# Patient Record
Sex: MALE | Race: OTHER | NOT HISPANIC OR LATINO
[De-identification: names, ages, dates, MRNs, and addresses within clinical notes are randomized per-mention and may not be internally consistent; named-entity substitution may affect disease eponyms.]

---

## 2017-05-23 ENCOUNTER — APPOINTMENT (OUTPATIENT)
Dept: INTERNAL MEDICINE | Facility: CLINIC | Age: 52
End: 2017-05-23

## 2017-05-23 VITALS
WEIGHT: 147 LBS | BODY MASS INDEX: 19.94 KG/M2 | SYSTOLIC BLOOD PRESSURE: 110 MMHG | HEART RATE: 72 BPM | DIASTOLIC BLOOD PRESSURE: 60 MMHG

## 2017-06-08 ENCOUNTER — OTHER (OUTPATIENT)
Age: 52
End: 2017-06-08

## 2018-05-24 ENCOUNTER — APPOINTMENT (OUTPATIENT)
Dept: INTERNAL MEDICINE | Facility: CLINIC | Age: 53
End: 2018-05-24
Payer: COMMERCIAL

## 2018-05-24 VITALS
WEIGHT: 146 LBS | BODY MASS INDEX: 19.8 KG/M2 | HEART RATE: 70 BPM | RESPIRATION RATE: 14 BRPM | DIASTOLIC BLOOD PRESSURE: 62 MMHG | SYSTOLIC BLOOD PRESSURE: 108 MMHG

## 2018-05-24 DIAGNOSIS — Z81.8 FAMILY HISTORY OF OTHER MENTAL AND BEHAVIORAL DISORDERS: ICD-10-CM

## 2018-05-24 PROCEDURE — 99396 PREV VISIT EST AGE 40-64: CPT

## 2018-05-24 NOTE — ASSESSMENT
[FreeTextEntry1] : Screening for depression done at this visit.  10 minutes spent in assessment and review.\par

## 2018-05-24 NOTE — COUNSELING
[Healthy eating counseling provided] : healthy eating [Activity counseling provided] : activity [None] : None [Good understanding] : Patient has a good understanding of lifestyle changes and the steps needed to achieve self management goals

## 2018-05-24 NOTE — HISTORY OF PRESENT ILLNESS
[FreeTextEntry1] : Here for CPE.\par  [de-identified] : Generally feels well with no specific complaints.\par

## 2018-05-24 NOTE — HEALTH RISK ASSESSMENT
[Excellent] : ~his/her~  mood as  excellent [No falls in past year] : Patient reported no falls in the past year [0] : 2) Feeling down, depressed, or hopeless: Not at all (0) [Reviewed and Current] :  reviewed and current [None] : None [With Family] : lives with family [Employed] : employed [] :  [Feels Safe at Home] : Feels safe at home [Fully functional (bathing, dressing, toileting, transferring, walking, feeding)] : Fully functional (bathing, dressing, toileting, transferring, walking, feeding) [Fully functional (using the telephone, shopping, preparing meals, housekeeping, doing laundry, using] : Fully functional and needs no help or supervision to perform IADLs (using the telephone, shopping, preparing meals, housekeeping, doing laundry, using transportation, managing medications and managing finances) [Smoke Detector] : smoke detector [Seat Belt] :  uses seat belt [] : No [[id number]] : 0 [Change in mental status noted] : No change in mental status noted [Language] : denies difficulty with language [Behavior] : denies difficulty with behavior [Handling Complex Tasks] : denies difficulty handling complex tasks [Reports changes in hearing] : Reports no changes in hearing [Reports changes in vision] : Reports no changes in vision [Reports changes in dental health] : Reports no changes in dental health [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

## 2018-05-30 ENCOUNTER — OTHER (OUTPATIENT)
Age: 53
End: 2018-05-30

## 2019-03-25 ENCOUNTER — TRANSCRIPTION ENCOUNTER (OUTPATIENT)
Age: 54
End: 2019-03-25

## 2019-05-28 ENCOUNTER — APPOINTMENT (OUTPATIENT)
Dept: INTERNAL MEDICINE | Facility: CLINIC | Age: 54
End: 2019-05-28
Payer: COMMERCIAL

## 2019-05-28 VITALS
WEIGHT: 149 LBS | SYSTOLIC BLOOD PRESSURE: 106 MMHG | RESPIRATION RATE: 14 BRPM | DIASTOLIC BLOOD PRESSURE: 68 MMHG | HEART RATE: 74 BPM | BODY MASS INDEX: 20.21 KG/M2

## 2019-05-28 DIAGNOSIS — Z86.69 PERSONAL HISTORY OF OTHER DISEASES OF THE NERVOUS SYSTEM AND SENSE ORGANS: ICD-10-CM

## 2019-05-28 DIAGNOSIS — M54.5 LOW BACK PAIN: ICD-10-CM

## 2019-05-28 PROCEDURE — G0444 DEPRESSION SCREEN ANNUAL: CPT

## 2019-05-28 PROCEDURE — 99396 PREV VISIT EST AGE 40-64: CPT

## 2019-05-28 PROCEDURE — 99386 PREV VISIT NEW AGE 40-64: CPT

## 2019-05-28 NOTE — HEALTH RISK ASSESSMENT
[Excellent] : ~his/her~  mood as  excellent [] : No [No falls in past year] : Patient reported no falls in the past year [0] : 2) Feeling down, depressed, or hopeless: Not at all (0) [de-identified] : regularly [[id number]] : 0 [Change in mental status noted] : No change in mental status noted [Language] : denies difficulty with language [Learning/Retaining New Information] : denies difficulty learning/retaining new information [Behavior] : denies difficulty with behavior [Handling Complex Tasks] : denies difficulty handling complex tasks [Reasoning] : denies difficulty with reasoning [Spatial Ability and Orientation] : denies difficulty with spatial ability and orientation [None] : None [With Family] : lives with family [Employed] : employed [] :  [Feels Safe at Home] : Feels safe at home [Fully functional (bathing, dressing, toileting, transferring, walking, feeding)] : Fully functional (bathing, dressing, toileting, transferring, walking, feeding) [Fully functional (using the telephone, shopping, preparing meals, housekeeping, doing laundry, using] : Fully functional and needs no help or supervision to perform IADLs (using the telephone, shopping, preparing meals, housekeeping, doing laundry, using transportation, managing medications and managing finances) [Reports changes in hearing] : Reports no changes in hearing [Reports changes in vision] : Reports no changes in vision [Reports normal functional visual acuity (ie: able to read med bottle)] : Reports normal functional visual acuity [Reports changes in dental health] : Reports no changes in dental health [Smoke Detector] : smoke detector [Seat Belt] :  uses seat belt

## 2019-05-28 NOTE — PHYSICAL EXAM
[No Acute Distress] : no acute distress [Well Nourished] : well nourished [Well Developed] : well developed [Well-Appearing] : well-appearing [PERRL] : pupils equal round and reactive to light [Normal Sclera/Conjunctiva] : normal sclera/conjunctiva [EOMI] : extraocular movements intact [Normal Outer Ear/Nose] : the outer ears and nose were normal in appearance [Normal Oropharynx] : the oropharynx was normal [Supple] : supple [No JVD] : no jugular venous distention [No Lymphadenopathy] : no lymphadenopathy [Thyroid Normal, No Nodules] : the thyroid was normal and there were no nodules present [Clear to Auscultation] : lungs were clear to auscultation bilaterally [No Accessory Muscle Use] : no accessory muscle use [No Respiratory Distress] : no respiratory distress  [Normal Rate] : normal rate  [Regular Rhythm] : with a regular rhythm [Normal S1, S2] : normal S1 and S2 [No Murmur] : no murmur heard [No Abdominal Bruit] : a ~M bruit was not heard ~T in the abdomen [No Carotid Bruits] : no carotid bruits [Pedal Pulses Present] : the pedal pulses are present [No Varicosities] : no varicosities [No Edema] : there was no peripheral edema [No Extremity Clubbing/Cyanosis] : no extremity clubbing/cyanosis [No Palpable Aorta] : no palpable aorta [Non Tender] : non-tender [Soft] : abdomen soft [No HSM] : no HSM [No Masses] : no abdominal mass palpated [Non-distended] : non-distended [Normal Posterior Cervical Nodes] : no posterior cervical lymphadenopathy [Normal Bowel Sounds] : normal bowel sounds [No CVA Tenderness] : no CVA  tenderness [Normal Anterior Cervical Nodes] : no anterior cervical lymphadenopathy [No Joint Swelling] : no joint swelling [No Spinal Tenderness] : no spinal tenderness [Grossly Normal Strength/Tone] : grossly normal strength/tone [No Rash] : no rash [Normal Gait] : normal gait [Coordination Grossly Intact] : coordination grossly intact [No Focal Deficits] : no focal deficits [Deep Tendon Reflexes (DTR)] : deep tendon reflexes were 2+ and symmetric [Normal Affect] : the affect was normal [Normal Insight/Judgement] : insight and judgment were intact

## 2019-05-28 NOTE — HISTORY OF PRESENT ILLNESS
[de-identified] : Generally feels well with no specific complaints.\par \par Denies headache, dizziness.\par Denies rash, fatigue, fever, weight loss, anorexia.\par Denies cough, wheezing.\par Denies CP, SOB, BROOKS, edema, palpitations.\par Denies abdominal pain, N/V/D/C. Denies BRBPR, melena\par Denies dysuria, frequency, hematuria.\par  [FreeTextEntry1] : Here for CPE.\par

## 2020-06-02 ENCOUNTER — APPOINTMENT (OUTPATIENT)
Dept: INTERNAL MEDICINE | Facility: CLINIC | Age: 55
End: 2020-06-02
Payer: COMMERCIAL

## 2020-06-02 VITALS
HEART RATE: 70 BPM | RESPIRATION RATE: 14 BRPM | DIASTOLIC BLOOD PRESSURE: 60 MMHG | SYSTOLIC BLOOD PRESSURE: 100 MMHG | WEIGHT: 143 LBS | BODY MASS INDEX: 19.39 KG/M2

## 2020-06-02 DIAGNOSIS — E55.9 VITAMIN D DEFICIENCY, UNSPECIFIED: ICD-10-CM

## 2020-06-02 DIAGNOSIS — E78.5 HYPERLIPIDEMIA, UNSPECIFIED: ICD-10-CM

## 2020-06-02 DIAGNOSIS — M22.2X2 PATELLOFEMORAL DISORDERS, LEFT KNEE: ICD-10-CM

## 2020-06-02 DIAGNOSIS — R73.03 PREDIABETES.: ICD-10-CM

## 2020-06-02 DIAGNOSIS — M75.00 ADHESIVE CAPSULITIS OF UNSPECIFIED SHOULDER: ICD-10-CM

## 2020-06-02 PROCEDURE — G0442 ANNUAL ALCOHOL SCREEN 15 MIN: CPT | Mod: 59

## 2020-06-02 PROCEDURE — 99396 PREV VISIT EST AGE 40-64: CPT

## 2020-06-02 NOTE — PHYSICAL EXAM
[No Acute Distress] : no acute distress [Well Nourished] : well nourished [Well Developed] : well developed [Well-Appearing] : well-appearing [PERRL] : pupils equal round and reactive to light [Normal Sclera/Conjunctiva] : normal sclera/conjunctiva [EOMI] : extraocular movements intact [Normal Outer Ear/Nose] : the outer ears and nose were normal in appearance [Normal Oropharynx] : the oropharynx was normal [No JVD] : no jugular venous distention [No Lymphadenopathy] : no lymphadenopathy [Supple] : supple [Thyroid Normal, No Nodules] : the thyroid was normal and there were no nodules present [No Respiratory Distress] : no respiratory distress  [No Accessory Muscle Use] : no accessory muscle use [Clear to Auscultation] : lungs were clear to auscultation bilaterally [Normal Rate] : normal rate  [Regular Rhythm] : with a regular rhythm [Normal S1, S2] : normal S1 and S2 [No Carotid Bruits] : no carotid bruits [No Murmur] : no murmur heard [No Abdominal Bruit] : a ~M bruit was not heard ~T in the abdomen [No Varicosities] : no varicosities [Pedal Pulses Present] : the pedal pulses are present [No Edema] : there was no peripheral edema [No Palpable Aorta] : no palpable aorta [No Extremity Clubbing/Cyanosis] : no extremity clubbing/cyanosis [Soft] : abdomen soft [Non Tender] : non-tender [Non-distended] : non-distended [No HSM] : no HSM [No Masses] : no abdominal mass palpated [Normal Bowel Sounds] : normal bowel sounds [Normal Posterior Cervical Nodes] : no posterior cervical lymphadenopathy [Normal Anterior Cervical Nodes] : no anterior cervical lymphadenopathy [No CVA Tenderness] : no CVA  tenderness [No Joint Swelling] : no joint swelling [No Spinal Tenderness] : no spinal tenderness [Grossly Normal Strength/Tone] : grossly normal strength/tone [No Rash] : no rash [Coordination Grossly Intact] : coordination grossly intact [No Focal Deficits] : no focal deficits [Deep Tendon Reflexes (DTR)] : deep tendon reflexes were 2+ and symmetric [Normal Gait] : normal gait [Normal Affect] : the affect was normal [Normal Insight/Judgement] : insight and judgment were intact [Normal] : Normal [None] : None [Patellar Grind] : positive patellar grind

## 2020-06-02 NOTE — ASSESSMENT
[FreeTextEntry1] : Age appropriate screening tests and vaccines reviewed and are up to date.\par \par This patient has no special communication needs.\par \par There are no significant behaviors affecting health.\par \par This patient's health literacy was assessed through teach back and was found to be adequate.\par \par Screening for depression done at this visit.  10 minutes spent in assessment and review.\par \par Knee extension exercises explained for patellofemoral syndrome\par \par \par \par \par

## 2020-06-02 NOTE — HEALTH RISK ASSESSMENT
[Excellent] : ~his/her~ current health as excellent [No] : In the past 12 months have you used drugs other than those required for medical reasons? No [No falls in past year] : Patient reported no falls in the past year [0] : 2) Feeling down, depressed, or hopeless: Not at all (0) [None] : None [With Family] : lives with family [Employed] : employed [] :  [Feels Safe at Home] : Feels safe at home [Fully functional (bathing, dressing, toileting, transferring, walking, feeding)] : Fully functional (bathing, dressing, toileting, transferring, walking, feeding) [Fully functional (using the telephone, shopping, preparing meals, housekeeping, doing laundry, using] : Fully functional and needs no help or supervision to perform IADLs (using the telephone, shopping, preparing meals, housekeeping, doing laundry, using transportation, managing medications and managing finances) [Reports normal functional visual acuity (ie: able to read med bottle)] : Reports normal functional visual acuity [Smoke Detector] : smoke detector [Seat Belt] :  uses seat belt [] : No [[id number]] : 0 [Change in mental status noted] : No change in mental status noted [Language] : denies difficulty with language [Behavior] : denies difficulty with behavior [Learning/Retaining New Information] : denies difficulty learning/retaining new information [Handling Complex Tasks] : denies difficulty handling complex tasks [Reasoning] : denies difficulty with reasoning [Spatial Ability and Orientation] : denies difficulty with spatial ability and orientation [Reports changes in vision] : Reports no changes in vision [Reports changes in hearing] : Reports no changes in hearing [Reports changes in dental health] : Reports no changes in dental health

## 2020-06-02 NOTE — HISTORY OF PRESENT ILLNESS
[FreeTextEntry1] : Mr. JONAS is here for an annual physical examination and assessment.\par  [de-identified] : He generally feels well with no specific complaints. His recent health has been good.\par Mild pain in left knee with traversing stairs.  No prior trauma.  No swelling. \par \par Denies headache, dizziness.\par Denies rash, fatigue, fever, weight loss, anorexia.\par Denies cough, wheezing.\par Denies CP, SOB, BROOKS, edema, palpitations.\par Denies abdominal pain, N/V/D/C. Denies BRBPR, melena, dysphagia.\par Denies dysuria, frequency, hematuria, hesitancy.\par Denies weakness, numbness, gait instability.\par

## 2020-06-03 LAB
25(OH)D3 SERPL-MCNC: 33.5 NG/ML
ALBUMIN SERPL ELPH-MCNC: 4.8 G/DL
ALP BLD-CCNC: 67 U/L
ALT SERPL-CCNC: 12 U/L
ANION GAP SERPL CALC-SCNC: 13 MMOL/L
AST SERPL-CCNC: 17 U/L
BASOPHILS # BLD AUTO: 0.05 K/UL
BASOPHILS NFR BLD AUTO: 0.7 %
BILIRUB SERPL-MCNC: 0.6 MG/DL
BUN SERPL-MCNC: 17 MG/DL
CALCIUM SERPL-MCNC: 9.8 MG/DL
CHLORIDE SERPL-SCNC: 100 MMOL/L
CHOLEST SERPL-MCNC: 202 MG/DL
CHOLEST/HDLC SERPL: 4.5 RATIO
CO2 SERPL-SCNC: 25 MMOL/L
CREAT SERPL-MCNC: 1.28 MG/DL
EOSINOPHIL # BLD AUTO: 0.11 K/UL
EOSINOPHIL NFR BLD AUTO: 1.5 %
ESTIMATED AVERAGE GLUCOSE: 123 MG/DL
GLUCOSE SERPL-MCNC: 98 MG/DL
HBA1C MFR BLD HPLC: 5.9 %
HCT VFR BLD CALC: 42.8 %
HDLC SERPL-MCNC: 45 MG/DL
HGB BLD-MCNC: 13.6 G/DL
IMM GRANULOCYTES NFR BLD AUTO: 0.3 %
LDLC SERPL CALC-MCNC: 141 MG/DL
LDLC SERPL DIRECT ASSAY-MCNC: 138 MG/DL
LYMPHOCYTES # BLD AUTO: 1.94 K/UL
LYMPHOCYTES NFR BLD AUTO: 25.9 %
MAN DIFF?: NORMAL
MCHC RBC-ENTMCNC: 28.4 PG
MCHC RBC-ENTMCNC: 31.8 GM/DL
MCV RBC AUTO: 89.4 FL
MONOCYTES # BLD AUTO: 0.6 K/UL
MONOCYTES NFR BLD AUTO: 8 %
NEUTROPHILS # BLD AUTO: 4.77 K/UL
NEUTROPHILS NFR BLD AUTO: 63.6 %
PLATELET # BLD AUTO: 190 K/UL
POTASSIUM SERPL-SCNC: 5 MMOL/L
PROT SERPL-MCNC: 7.1 G/DL
PSA SERPL-MCNC: 1.32 NG/ML
RBC # BLD: 4.79 M/UL
RBC # FLD: 12.7 %
SARS-COV-2 IGG SERPL IA-ACNC: 0.1 INDEX
SARS-COV-2 IGG SERPL QL IA: NEGATIVE
SODIUM SERPL-SCNC: 138 MMOL/L
TRIGL SERPL-MCNC: 87 MG/DL
WBC # FLD AUTO: 7.49 K/UL

## 2021-05-25 ENCOUNTER — NON-APPOINTMENT (OUTPATIENT)
Age: 56
End: 2021-05-25

## 2021-05-25 DIAGNOSIS — Z11.59 ENCOUNTER FOR SCREENING FOR OTHER VIRAL DISEASES: ICD-10-CM

## 2021-06-02 ENCOUNTER — NON-APPOINTMENT (OUTPATIENT)
Age: 56
End: 2021-06-02

## 2021-06-03 ENCOUNTER — APPOINTMENT (OUTPATIENT)
Dept: INTERNAL MEDICINE | Facility: CLINIC | Age: 56
End: 2021-06-03
Payer: COMMERCIAL

## 2021-06-03 VITALS
RESPIRATION RATE: 14 BRPM | DIASTOLIC BLOOD PRESSURE: 80 MMHG | HEART RATE: 68 BPM | WEIGHT: 148 LBS | SYSTOLIC BLOOD PRESSURE: 120 MMHG | BODY MASS INDEX: 20.07 KG/M2

## 2021-06-03 PROCEDURE — 99396 PREV VISIT EST AGE 40-64: CPT

## 2021-06-03 PROCEDURE — 99386 PREV VISIT NEW AGE 40-64: CPT

## 2021-06-03 PROCEDURE — 99072 ADDL SUPL MATRL&STAF TM PHE: CPT

## 2021-06-03 RX ORDER — FEXOFENADINE HYDROCHLORIDE 180 MG/1
180 TABLET ORAL DAILY
Qty: 3 | Refills: 3 | Status: ACTIVE | COMMUNITY
Start: 2021-06-03

## 2021-06-03 RX ORDER — CETIRIZINE HYDROCHLORIDE 10 MG/1
10 CAPSULE, LIQUID FILLED ORAL
Refills: 0 | Status: DISCONTINUED | COMMUNITY
Start: 2017-05-23 | End: 2021-06-03

## 2021-06-03 NOTE — HEALTH RISK ASSESSMENT
[] : No [Audit-CScore] : 0 [[id number]] : 0 [Change in mental status noted] : No change in mental status noted [Language] : denies difficulty with language [Behavior] : denies difficulty with behavior [Learning/Retaining New Information] : denies difficulty learning/retaining new information [Handling Complex Tasks] : denies difficulty handling complex tasks [Reasoning] : denies difficulty with reasoning [Spatial Ability and Orientation] : denies difficulty with spatial ability and orientation [Reports changes in hearing] : Reports no changes in hearing [Reports changes in vision] : Reports no changes in vision [Reports changes in dental health] : Reports no changes in dental health

## 2021-06-03 NOTE — HISTORY OF PRESENT ILLNESS
[FreeTextEntry1] : Mr. JONAS is here for an annual physical examination and assessment.\par  [de-identified] : He generally feels well with no specific complaints. His recent health has been good.\par \par Denies headache, dizziness.\par Denies rash, fatigue, fever, weight loss, anorexia.\par Denies cough, wheezing.\par Denies CP, SOB, BROOKS, edema, palpitations.\par Denies abdominal pain, N/V/D/C. Denies BRBPR, melena, dysphagia.\par Denies dysuria, frequency, hematuria, hesitancy.\par Denies weakness, numbness, gait instability.\par

## 2021-06-04 LAB
25(OH)D3 SERPL-MCNC: 37.7 NG/ML
ALBUMIN SERPL ELPH-MCNC: 4.5 G/DL
ALP BLD-CCNC: 67 U/L
ALT SERPL-CCNC: 23 U/L
ANION GAP SERPL CALC-SCNC: 15 MMOL/L
AST SERPL-CCNC: 22 U/L
BASOPHILS # BLD AUTO: 0.05 K/UL
BASOPHILS NFR BLD AUTO: 0.6 %
BILIRUB SERPL-MCNC: 0.5 MG/DL
BUN SERPL-MCNC: 15 MG/DL
CALCIUM SERPL-MCNC: 9.4 MG/DL
CHLORIDE SERPL-SCNC: 102 MMOL/L
CHOLEST SERPL-MCNC: 206 MG/DL
CO2 SERPL-SCNC: 24 MMOL/L
CREAT SERPL-MCNC: 1.27 MG/DL
EOSINOPHIL # BLD AUTO: 0.18 K/UL
EOSINOPHIL NFR BLD AUTO: 2.3 %
ESTIMATED AVERAGE GLUCOSE: 128 MG/DL
GLUCOSE SERPL-MCNC: 87 MG/DL
HBA1C MFR BLD HPLC: 6.1 %
HCT VFR BLD CALC: 43.1 %
HDLC SERPL-MCNC: 40 MG/DL
HGB BLD-MCNC: 13.4 G/DL
IMM GRANULOCYTES NFR BLD AUTO: 0.3 %
LDLC SERPL CALC-MCNC: 141 MG/DL
LDLC SERPL DIRECT ASSAY-MCNC: 147 MG/DL
LYMPHOCYTES # BLD AUTO: 2.15 K/UL
LYMPHOCYTES NFR BLD AUTO: 27.8 %
MAN DIFF?: NORMAL
MCHC RBC-ENTMCNC: 28 PG
MCHC RBC-ENTMCNC: 31.1 GM/DL
MCV RBC AUTO: 90.2 FL
MONOCYTES # BLD AUTO: 0.68 K/UL
MONOCYTES NFR BLD AUTO: 8.8 %
NEUTROPHILS # BLD AUTO: 4.65 K/UL
NEUTROPHILS NFR BLD AUTO: 60.2 %
NONHDLC SERPL-MCNC: 166 MG/DL
PLATELET # BLD AUTO: 187 K/UL
POTASSIUM SERPL-SCNC: 4.8 MMOL/L
PROT SERPL-MCNC: 7 G/DL
PSA SERPL-MCNC: 1.94 NG/ML
RBC # BLD: 4.78 M/UL
RBC # FLD: 12.9 %
SODIUM SERPL-SCNC: 140 MMOL/L
TRIGL SERPL-MCNC: 122 MG/DL
WBC # FLD AUTO: 7.73 K/UL

## 2022-04-11 PROBLEM — Z11.59 SCREENING FOR VIRAL DISEASE: Status: RESOLVED | Noted: 2020-06-02 | Resolved: 2021-05-25

## 2022-06-09 ENCOUNTER — APPOINTMENT (OUTPATIENT)
Dept: INTERNAL MEDICINE | Facility: CLINIC | Age: 57
End: 2022-06-09
Payer: COMMERCIAL

## 2022-06-09 PROCEDURE — 99396 PREV VISIT EST AGE 40-64: CPT

## 2022-06-09 PROCEDURE — G0444 DEPRESSION SCREEN ANNUAL: CPT | Mod: NC,59

## 2022-06-09 PROCEDURE — G0442 ANNUAL ALCOHOL SCREEN 15 MIN: CPT | Mod: NC

## 2022-06-10 VITALS
SYSTOLIC BLOOD PRESSURE: 100 MMHG | WEIGHT: 152 LBS | RESPIRATION RATE: 14 BRPM | BODY MASS INDEX: 20.61 KG/M2 | DIASTOLIC BLOOD PRESSURE: 60 MMHG | HEART RATE: 64 BPM

## 2022-06-10 LAB
25(OH)D3 SERPL-MCNC: 38.3 NG/ML
ALBUMIN SERPL ELPH-MCNC: 4.9 G/DL
ALP BLD-CCNC: 65 U/L
ALT SERPL-CCNC: 16 U/L
ANION GAP SERPL CALC-SCNC: 11 MMOL/L
AST SERPL-CCNC: 18 U/L
BASOPHILS # BLD AUTO: 0.05 K/UL
BASOPHILS NFR BLD AUTO: 0.8 %
BILIRUB SERPL-MCNC: 0.6 MG/DL
BUN SERPL-MCNC: 18 MG/DL
CALCIUM SERPL-MCNC: 9.8 MG/DL
CHLORIDE SERPL-SCNC: 103 MMOL/L
CHOLEST SERPL-MCNC: 216 MG/DL
CO2 SERPL-SCNC: 27 MMOL/L
CREAT SERPL-MCNC: 1.27 MG/DL
EGFR: 66 ML/MIN/1.73M2
EOSINOPHIL # BLD AUTO: 0.19 K/UL
EOSINOPHIL NFR BLD AUTO: 2.9 %
ESTIMATED AVERAGE GLUCOSE: 131 MG/DL
GLUCOSE SERPL-MCNC: 94 MG/DL
HBA1C MFR BLD HPLC: 6.2 %
HCT VFR BLD CALC: 40.7 %
HDLC SERPL-MCNC: 41 MG/DL
HGB BLD-MCNC: 12.7 G/DL
IMM GRANULOCYTES NFR BLD AUTO: 0.2 %
LDLC SERPL CALC-MCNC: 151 MG/DL
LDLC SERPL DIRECT ASSAY-MCNC: 148 MG/DL
LYMPHOCYTES # BLD AUTO: 1.73 K/UL
LYMPHOCYTES NFR BLD AUTO: 26.5 %
MAN DIFF?: NORMAL
MCHC RBC-ENTMCNC: 27.6 PG
MCHC RBC-ENTMCNC: 31.2 GM/DL
MCV RBC AUTO: 88.5 FL
MONOCYTES # BLD AUTO: 0.61 K/UL
MONOCYTES NFR BLD AUTO: 9.4 %
NEUTROPHILS # BLD AUTO: 3.93 K/UL
NEUTROPHILS NFR BLD AUTO: 60.2 %
NONHDLC SERPL-MCNC: 175 MG/DL
PLATELET # BLD AUTO: 171 K/UL
POTASSIUM SERPL-SCNC: 4.6 MMOL/L
PROT SERPL-MCNC: 7 G/DL
PSA SERPL-MCNC: 1.42 NG/ML
RBC # BLD: 4.6 M/UL
RBC # FLD: 13.4 %
SODIUM SERPL-SCNC: 140 MMOL/L
TRIGL SERPL-MCNC: 120 MG/DL
WBC # FLD AUTO: 6.52 K/UL

## 2022-06-10 NOTE — HEALTH RISK ASSESSMENT
[Excellent] : ~his/her~  mood as  excellent [Never] : Never [No] : In the past 12 months have you used drugs other than those required for medical reasons? No [No falls in past year] : Patient reported no falls in the past year [0] : 2) Feeling down, depressed, or hopeless: Not at all (0) [PHQ-2 Negative - No further assessment needed] : PHQ-2 Negative - No further assessment needed [Audit-CScore] : 0 [[id number]] : 0 [Change in mental status noted] : No change in mental status noted [Language] : denies difficulty with language [Behavior] : denies difficulty with behavior [Learning/Retaining New Information] : denies difficulty learning/retaining new information [Handling Complex Tasks] : denies difficulty handling complex tasks [Reasoning] : denies difficulty with reasoning [Spatial Ability and Orientation] : denies difficulty with spatial ability and orientation [None] : None [With Family] : lives with family [Employed] : employed [] :  [Feels Safe at Home] : Feels safe at home [Fully functional (bathing, dressing, toileting, transferring, walking, feeding)] : Fully functional (bathing, dressing, toileting, transferring, walking, feeding) [Fully functional (using the telephone, shopping, preparing meals, housekeeping, doing laundry, using] : Fully functional and needs no help or supervision to perform IADLs (using the telephone, shopping, preparing meals, housekeeping, doing laundry, using transportation, managing medications and managing finances) [Reports changes in hearing] : Reports no changes in hearing [Reports changes in vision] : Reports no changes in vision [Reports normal functional visual acuity (ie: able to read med bottle)] : Reports normal functional visual acuity [Reports changes in dental health] : Reports no changes in dental health [Smoke Detector] : smoke detector [Seat Belt] :  uses seat belt

## 2022-06-10 NOTE — HISTORY OF PRESENT ILLNESS
[FreeTextEntry1] : Mr. JONAS is here for an annual physical examination and assessment.\par  [de-identified] : He generally feels well with no specific complaints. His recent health has been good.\par \par Denies headache, dizziness.\par Denies rash, fatigue, fever, weight loss, anorexia.\par Denies cough, wheezing.\par Denies CP, SOB, BROOKS, edema, palpitations.\par Denies abdominal pain, N/V/D/C. Denies BRBPR, melena, dysphagia.\par Denies dysuria, frequency, hematuria, hesitancy.\par Denies weakness, numbness, gait instability.\par

## 2023-06-13 ENCOUNTER — APPOINTMENT (OUTPATIENT)
Dept: INTERNAL MEDICINE | Facility: CLINIC | Age: 58
End: 2023-06-13
Payer: COMMERCIAL

## 2023-06-13 VITALS
WEIGHT: 151 LBS | RESPIRATION RATE: 14 BRPM | BODY MASS INDEX: 20.48 KG/M2 | HEART RATE: 68 BPM | SYSTOLIC BLOOD PRESSURE: 116 MMHG | DIASTOLIC BLOOD PRESSURE: 70 MMHG

## 2023-06-13 DIAGNOSIS — Z23 ENCOUNTER FOR IMMUNIZATION: ICD-10-CM

## 2023-06-13 LAB
25(OH)D3 SERPL-MCNC: 31.2 NG/ML
PSA SERPL-MCNC: 2.21 NG/ML

## 2023-06-13 PROCEDURE — 90471 IMMUNIZATION ADMIN: CPT

## 2023-06-13 PROCEDURE — 90750 HZV VACC RECOMBINANT IM: CPT

## 2023-06-13 PROCEDURE — 99396 PREV VISIT EST AGE 40-64: CPT | Mod: 25

## 2023-06-13 NOTE — HISTORY OF PRESENT ILLNESS
[FreeTextEntry1] : Mr. JONAS is here for an annual physical examination and assessment.\par  [de-identified] : He generally feels well with no specific complaints. His recent health has been good.\par \par Denies headache, dizziness.\par Denies rash, fatigue, fever, weight loss, anorexia.\par Denies cough, wheezing.\par Denies CP, SOB, BROOKS, edema, palpitations.\par Denies abdominal pain, N/V/D/C. Denies BRBPR, melena, dysphagia.\par Denies dysuria, frequency, hematuria, hesitancy.\par Denies weakness, numbness, gait instability.\par

## 2023-06-13 NOTE — HEALTH RISK ASSESSMENT
[Excellent] : ~his/her~  mood as  excellent [No] : In the past 12 months have you used drugs other than those required for medical reasons? No [No falls in past year] : Patient reported no falls in the past year [0] : 2) Feeling down, depressed, or hopeless: Not at all (0) [PHQ-2 Negative - No further assessment needed] : PHQ-2 Negative - No further assessment needed [None] : None [With Family] : lives with family [Employed] : employed [] :  [Feels Safe at Home] : Feels safe at home [Fully functional (bathing, dressing, toileting, transferring, walking, feeding)] : Fully functional (bathing, dressing, toileting, transferring, walking, feeding) [Fully functional (using the telephone, shopping, preparing meals, housekeeping, doing laundry, using] : Fully functional and needs no help or supervision to perform IADLs (using the telephone, shopping, preparing meals, housekeeping, doing laundry, using transportation, managing medications and managing finances) [Reports normal functional visual acuity (ie: able to read med bottle)] : Reports normal functional visual acuity [Smoke Detector] : smoke detector [Seat Belt] :  uses seat belt [Never] : Never [Audit-CScore] : 0 [[id number]] : 0 [Change in mental status noted] : No change in mental status noted [Language] : denies difficulty with language [Behavior] : denies difficulty with behavior [Learning/Retaining New Information] : denies difficulty learning/retaining new information [Handling Complex Tasks] : denies difficulty handling complex tasks [Reasoning] : denies difficulty with reasoning [Spatial Ability and Orientation] : denies difficulty with spatial ability and orientation [Reports changes in hearing] : Reports no changes in hearing [Reports changes in vision] : Reports no changes in vision [Reports changes in dental health] : Reports no changes in dental health

## 2023-06-14 LAB
ALBUMIN SERPL ELPH-MCNC: 4.4 G/DL
ALP BLD-CCNC: 67 U/L
ALT SERPL-CCNC: 14 U/L
ANION GAP SERPL CALC-SCNC: 14 MMOL/L
AST SERPL-CCNC: 16 U/L
BILIRUB SERPL-MCNC: 0.5 MG/DL
BUN SERPL-MCNC: 15 MG/DL
CALCIUM SERPL-MCNC: 9.6 MG/DL
CHLORIDE SERPL-SCNC: 104 MMOL/L
CHOLEST SERPL-MCNC: 187 MG/DL
CO2 SERPL-SCNC: 24 MMOL/L
CREAT SERPL-MCNC: 1.35 MG/DL
EGFR: 61 ML/MIN/1.73M2
ESTIMATED AVERAGE GLUCOSE: 134 MG/DL
GLUCOSE SERPL-MCNC: 106 MG/DL
HBA1C MFR BLD HPLC: 6.3 %
HDLC SERPL-MCNC: 40 MG/DL
LDLC SERPL CALC-MCNC: 129 MG/DL
NONHDLC SERPL-MCNC: 148 MG/DL
POTASSIUM SERPL-SCNC: 5 MMOL/L
PROT SERPL-MCNC: 7 G/DL
SODIUM SERPL-SCNC: 142 MMOL/L
TRIGL SERPL-MCNC: 92 MG/DL

## 2024-03-22 ENCOUNTER — NON-APPOINTMENT (OUTPATIENT)
Age: 59
End: 2024-03-22

## 2024-06-19 ENCOUNTER — OUTPATIENT (OUTPATIENT)
Dept: OUTPATIENT SERVICES | Facility: HOSPITAL | Age: 59
LOS: 1 days | End: 2024-06-19
Payer: COMMERCIAL

## 2024-06-19 ENCOUNTER — APPOINTMENT (OUTPATIENT)
Dept: INTERNAL MEDICINE | Facility: CLINIC | Age: 59
End: 2024-06-19
Payer: COMMERCIAL

## 2024-06-19 VITALS
SYSTOLIC BLOOD PRESSURE: 110 MMHG | DIASTOLIC BLOOD PRESSURE: 60 MMHG | HEART RATE: 64 BPM | BODY MASS INDEX: 20.07 KG/M2 | RESPIRATION RATE: 14 BRPM | WEIGHT: 148 LBS

## 2024-06-19 DIAGNOSIS — I10 ESSENTIAL (PRIMARY) HYPERTENSION: ICD-10-CM

## 2024-06-19 DIAGNOSIS — Z00.00 ENCOUNTER FOR GENERAL ADULT MEDICAL EXAMINATION W/OUT ABNORMAL FINDINGS: ICD-10-CM

## 2024-06-19 PROCEDURE — 99396 PREV VISIT EST AGE 40-64: CPT

## 2024-06-19 PROCEDURE — G0463: CPT

## 2024-06-19 NOTE — HISTORY OF PRESENT ILLNESS
[FreeTextEntry1] : Mr. JONAS is here for an annual physical examination and assessment. [de-identified] : He generally feels well with no specific complaints. His recent health has been good.  Denies headache, dizziness. Denies rash, fatigue, fever, weight loss, anorexia. Denies cough, wheezing. Denies CP, SOB, BROOKS, edema, palpitations. Denies abdominal pain, N/V/D/C. Denies BRBPR, melena, dysphagia. Denies dysuria, frequency, hematuria, hesitancy. Denies weakness, numbness, gait instability.

## 2024-06-19 NOTE — HEALTH RISK ASSESSMENT
[Excellent] : ~his/her~  mood as  excellent [No] : In the past 12 months have you used drugs other than those required for medical reasons? No [No falls in past year] : Patient reported no falls in the past year [0] : 2) Feeling down, depressed, or hopeless: Not at all (0) [PHQ-2 Negative - No further assessment needed] : PHQ-2 Negative - No further assessment needed [With Family] : lives with family [Employed] : employed [] :  [Feels Safe at Home] : Feels safe at home [Fully functional (bathing, dressing, toileting, transferring, walking, feeding)] : Fully functional (bathing, dressing, toileting, transferring, walking, feeding) [Fully functional (using the telephone, shopping, preparing meals, housekeeping, doing laundry, using] : Fully functional and needs no help or supervision to perform IADLs (using the telephone, shopping, preparing meals, housekeeping, doing laundry, using transportation, managing medications and managing finances) [Reports changes in dental health] : Reports changes in dental health [Smoke Detector] : smoke detector [Seat Belt] :  uses seat belt [[id number]] : 0 [Audit-CScore] : 0 [Change in mental status noted] : No change in mental status noted [Language] : denies difficulty with language [Behavior] : denies difficulty with behavior [Learning/Retaining New Information] : denies difficulty learning/retaining new information [Handling Complex Tasks] : denies difficulty handling complex tasks [Reasoning] : denies difficulty with reasoning [Spatial Ability and Orientation] : denies difficulty with spatial ability and orientation [Reports changes in hearing] : Reports no changes in hearing [Reports changes in vision] : Reports no changes in vision

## 2024-06-20 LAB
25(OH)D3 SERPL-MCNC: 29.1 NG/ML
ALBUMIN SERPL ELPH-MCNC: 4.5 G/DL
ALP BLD-CCNC: 63 U/L
ALT SERPL-CCNC: 17 U/L
ANION GAP SERPL CALC-SCNC: 12 MMOL/L
AST SERPL-CCNC: 18 U/L
BASOPHILS # BLD AUTO: 0.05 K/UL
BASOPHILS NFR BLD AUTO: 0.8 %
BILIRUB SERPL-MCNC: 0.6 MG/DL
BUN SERPL-MCNC: 15 MG/DL
CALCIUM SERPL-MCNC: 9.5 MG/DL
CHLORIDE SERPL-SCNC: 101 MMOL/L
CHOLEST SERPL-MCNC: 190 MG/DL
CO2 SERPL-SCNC: 24 MMOL/L
CREAT SERPL-MCNC: 1.22 MG/DL
EGFR: 68 ML/MIN/1.73M2
EOSINOPHIL # BLD AUTO: 0.15 K/UL
EOSINOPHIL NFR BLD AUTO: 2.4 %
ESTIMATED AVERAGE GLUCOSE: 134 MG/DL
GLUCOSE SERPL-MCNC: 93 MG/DL
HBA1C MFR BLD HPLC: 6.3 %
HCT VFR BLD CALC: 41.5 %
HDLC SERPL-MCNC: 41 MG/DL
HGB BLD-MCNC: 13.2 G/DL
IMM GRANULOCYTES NFR BLD AUTO: 0.2 %
LDLC SERPL CALC-MCNC: 131 MG/DL
LYMPHOCYTES # BLD AUTO: 1.66 K/UL
LYMPHOCYTES NFR BLD AUTO: 26.9 %
MAN DIFF?: NORMAL
MCHC RBC-ENTMCNC: 28.1 PG
MCHC RBC-ENTMCNC: 31.8 GM/DL
MCV RBC AUTO: 88.3 FL
MONOCYTES # BLD AUTO: 0.56 K/UL
MONOCYTES NFR BLD AUTO: 9.1 %
NEUTROPHILS # BLD AUTO: 3.75 K/UL
NEUTROPHILS NFR BLD AUTO: 60.6 %
NONHDLC SERPL-MCNC: 149 MG/DL
PLATELET # BLD AUTO: 165 K/UL
POTASSIUM SERPL-SCNC: 4.6 MMOL/L
PROT SERPL-MCNC: 7 G/DL
PSA SERPL-MCNC: 1.1 NG/ML
RBC # BLD: 4.7 M/UL
RBC # FLD: 13.2 %
SODIUM SERPL-SCNC: 138 MMOL/L
TRIGL SERPL-MCNC: 99 MG/DL
WBC # FLD AUTO: 6.18 K/UL

## 2024-06-24 DIAGNOSIS — Z00.00 ENCOUNTER FOR GENERAL ADULT MEDICAL EXAMINATION WITHOUT ABNORMAL FINDINGS: ICD-10-CM

## 2025-06-19 LAB
25(OH)D3 SERPL-MCNC: 30.6 NG/ML
ALBUMIN SERPL ELPH-MCNC: 4.2 G/DL
ALP BLD-CCNC: 68 U/L
ALT SERPL-CCNC: 23 U/L
ANION GAP SERPL CALC-SCNC: 12 MMOL/L
AST SERPL-CCNC: 19 U/L
BASOPHILS # BLD AUTO: 0.06 K/UL
BASOPHILS NFR BLD AUTO: 1 %
BILIRUB SERPL-MCNC: 0.6 MG/DL
BUN SERPL-MCNC: 17 MG/DL
CALCIUM SERPL-MCNC: 9.8 MG/DL
CHLORIDE SERPL-SCNC: 102 MMOL/L
CHOLEST SERPL-MCNC: 201 MG/DL
CO2 SERPL-SCNC: 24 MMOL/L
CREAT SERPL-MCNC: 1.29 MG/DL
EGFRCR SERPLBLD CKD-EPI 2021: 63 ML/MIN/1.73M2
EOSINOPHIL # BLD AUTO: 0.18 K/UL
EOSINOPHIL NFR BLD AUTO: 3 %
GLUCOSE SERPL-MCNC: 98 MG/DL
HCT VFR BLD CALC: 40.8 %
HDLC SERPL-MCNC: 38 MG/DL
HGB BLD-MCNC: 13.1 G/DL
IMM GRANULOCYTES NFR BLD AUTO: 0.2 %
LDLC SERPL-MCNC: 146 MG/DL
LYMPHOCYTES # BLD AUTO: 2.07 K/UL
LYMPHOCYTES NFR BLD AUTO: 34.6 %
MAN DIFF?: NORMAL
MCHC RBC-ENTMCNC: 28 PG
MCHC RBC-ENTMCNC: 32.1 G/DL
MCV RBC AUTO: 87.2 FL
MONOCYTES # BLD AUTO: 0.58 K/UL
MONOCYTES NFR BLD AUTO: 9.7 %
NEUTROPHILS # BLD AUTO: 3.08 K/UL
NEUTROPHILS NFR BLD AUTO: 51.5 %
NONHDLC SERPL-MCNC: 163 MG/DL
PLATELET # BLD AUTO: 213 K/UL
POTASSIUM SERPL-SCNC: 5.3 MMOL/L
PROT SERPL-MCNC: 6.7 G/DL
PSA SERPL-MCNC: 1.13 NG/ML
RBC # BLD: 4.68 M/UL
RBC # FLD: 12.8 %
SODIUM SERPL-SCNC: 138 MMOL/L
TRIGL SERPL-MCNC: 94 MG/DL
URATE SERPL-MCNC: 8.5 MG/DL
WBC # FLD AUTO: 5.98 K/UL

## 2025-06-20 LAB
ESTIMATED AVERAGE GLUCOSE: 131 MG/DL
HBA1C MFR BLD HPLC: 6.2 %

## 2025-06-24 ENCOUNTER — APPOINTMENT (OUTPATIENT)
Dept: INTERNAL MEDICINE | Facility: CLINIC | Age: 60
End: 2025-06-24
Payer: COMMERCIAL

## 2025-06-24 ENCOUNTER — OUTPATIENT (OUTPATIENT)
Dept: OUTPATIENT SERVICES | Facility: HOSPITAL | Age: 60
LOS: 1 days | End: 2025-06-24
Payer: COMMERCIAL

## 2025-06-24 VITALS
RESPIRATION RATE: 14 BRPM | BODY MASS INDEX: 19.94 KG/M2 | WEIGHT: 147 LBS | HEART RATE: 60 BPM | SYSTOLIC BLOOD PRESSURE: 110 MMHG | DIASTOLIC BLOOD PRESSURE: 60 MMHG

## 2025-06-24 DIAGNOSIS — I10 ESSENTIAL (PRIMARY) HYPERTENSION: ICD-10-CM

## 2025-06-24 PROCEDURE — 99396 PREV VISIT EST AGE 40-64: CPT

## 2025-06-24 PROCEDURE — G0463: CPT

## 2025-07-01 DIAGNOSIS — Z00.00 ENCOUNTER FOR GENERAL ADULT MEDICAL EXAMINATION WITHOUT ABNORMAL FINDINGS: ICD-10-CM
